# Patient Record
Sex: MALE | Race: WHITE | Employment: FULL TIME | URBAN - METROPOLITAN AREA
[De-identification: names, ages, dates, MRNs, and addresses within clinical notes are randomized per-mention and may not be internally consistent; named-entity substitution may affect disease eponyms.]

---

## 2017-08-23 ENCOUNTER — OFFICE VISIT (OUTPATIENT)
Dept: FAMILY MEDICINE | Facility: CLINIC | Age: 42
End: 2017-08-23
Payer: COMMERCIAL

## 2017-08-23 VITALS
HEART RATE: 82 BPM | HEIGHT: 71 IN | TEMPERATURE: 99.6 F | OXYGEN SATURATION: 97 % | SYSTOLIC BLOOD PRESSURE: 108 MMHG | RESPIRATION RATE: 18 BRPM | DIASTOLIC BLOOD PRESSURE: 70 MMHG | BODY MASS INDEX: 35.84 KG/M2 | WEIGHT: 256 LBS

## 2017-08-23 DIAGNOSIS — M62.830 SPASM OF THORACIC BACK MUSCLE: Primary | ICD-10-CM

## 2017-08-23 PROCEDURE — 99214 OFFICE O/P EST MOD 30 MIN: CPT | Performed by: FAMILY MEDICINE

## 2017-08-23 RX ORDER — TRAMADOL HYDROCHLORIDE 50 MG/1
50-100 TABLET ORAL EVERY 6 HOURS PRN
Qty: 20 TABLET | Refills: 0 | Status: SHIPPED | OUTPATIENT
Start: 2017-08-23

## 2017-08-23 RX ORDER — CYCLOBENZAPRINE HCL 10 MG
5-10 TABLET ORAL 3 TIMES DAILY PRN
Qty: 30 TABLET | Refills: 1 | Status: SHIPPED | OUTPATIENT
Start: 2017-08-23

## 2017-08-23 ASSESSMENT — ENCOUNTER SYMPTOMS
NECK PAIN: 1
FOCAL WEAKNESS: 0
EYES NEGATIVE: 1
SENSORY CHANGE: 0
CONSTITUTIONAL NEGATIVE: 1
TINGLING: 0
HEADACHES: 0

## 2017-08-23 NOTE — PROGRESS NOTES
"HPI    SUBJECTIVE:   Casey Johnston is a 41 year old male who presents to clinic today for the following health issues:      Back Pain       Duration: 3 days        Specific cause: none    Description:   Location of pain: upper back left and shoulders left  Character of pain: sharp, dull ache  Pain radiation:none  New numbness or weakness in legs, not attributed to pain:  no     Intensity: Currently 5/10    History:   Pain interferes with job: YES, missed 3 days of work  History of back problems: recurrent self limited episodes of low back pain in the past  Any previous MRI or X-rays: None  Sees a specialist for back pain:  No  Therapies tried without relief: none    Alleviating factors:   Improved by: acetaminophen (Tylenol) and NSAIDs, takes the edge off      Precipitating factors:  Worsened by: reaching out or above head    Functional and Psychosocial Screen (Yunier STarT Back):      Not performed today          Accompanying Signs & Symptoms:  Risk of Fracture:  None  Risk of Cauda Equina:  None  Risk of Infection:  None  Risk of Cancer:  None  Risk of Ankylosing Spondylitis:  Onset at age <35, male, AND morning back stiffness. no     Woke three days ago with a bit of back pain and a \"knot\" in his back, worsened quickly over the morning.  Stayed home from work yesterday and today.  Better today.  Pain seems to arise in L neck, radiates to shoulder.  NO numbness/weakness into arm or hand, no HA.  Back-aid, advil, took the edge off, didn't really go away.  Topical helps.    No daily meds, no tobacco curerntly    Review of Systems   Constitutional: Negative.    Eyes: Negative.    Musculoskeletal: Positive for neck pain.   Neurological: Negative for tingling, sensory change, focal weakness and headaches.         Physical Exam   Constitutional: He is oriented to person, place, and time and well-developed, well-nourished, and in no distress.   Musculoskeletal:        Cervical back: He exhibits decreased range of " motion, pain and spasm. He exhibits no tenderness and no bony tenderness.   Neurological: He is alert and oriented to person, place, and time. He has normal strength.   Reflex Scores:       Tricep reflexes are 2+ on the left side.       Bicep reflexes are 2+ on the left side.       Brachioradialis reflexes are 2+ on the left side.  Skin: Skin is warm and dry.   Vitals reviewed.    (M62.830) Spasm of thoracic back muscle  (primary encounter diagnosis)  Comment: pain ladder discussed, can modify work restrictions if needed  Plan: cyclobenzaprine (FLEXERIL) 10 MG tablet,         traMADol (ULTRAM) 50 MG tablet              RTC in 2w    Casey Madden MD

## 2017-08-23 NOTE — NURSING NOTE
"Chief Complaint   Patient presents with     Back Pain     Letter for School/Work     work       Initial /70 (BP Location: Right arm, Patient Position: Chair, Cuff Size: Adult Large)  Pulse 82  Temp 99.6  F (37.6  C) (Oral)  Resp 18  Ht 5' 11\" (1.803 m)  Wt 256 lb (116.1 kg)  SpO2 97%  BMI 35.7 kg/m2 Estimated body mass index is 35.7 kg/(m^2) as calculated from the following:    Height as of this encounter: 5' 11\" (1.803 m).    Weight as of this encounter: 256 lb (116.1 kg).  Medication Reconciliation: complete.Sherrie ESCALERA MA      "

## 2017-08-23 NOTE — MR AVS SNAPSHOT
"              After Visit Summary   8/23/2017    Casey Johnston    MRN: [id number]           Patient Information     Date Of Birth          1975        Visit Information        Provider Department      8/23/2017 2:20 PM Casey Madden MD [facility]        Today's Diagnoses     Spasm of thoracic back muscle    -  1      Care Instructions    Pairing acetaminophen (Tylenol) with ibuprofen (Advil) has been shown to be as effective as morphine for controlling pain.    600-800 mg ibuprofen (3-4 tabs) with breakfast, lunch and dinner  1000 mg acetaminophen (2 extra strength tabs) at mid-morning, mid-afternoon and evening.    Do not take more than 3000mg of acetaminophen (6 extra strength tabs) in 24 hours.            Follow-ups after your visit        Who to contact     If you have questions or need follow up information about today's clinic visit or your schedule please contact [facility] directly at [phone].  Normal or non-critical lab and imaging results will be communicated to you by [employer], letter or phone within 4 business days after the clinic has received the results. If you do not hear from us within 7 days, please contact the clinic through [employer] or phone. If you have a critical or abnormal lab result, we will notify you by phone as soon as possible.  Submit refill requests through [employer] or call your pharmacy and they will forward the refill request to us. Please allow 3 business days for your refill to be completed.          Additional Information About Your Visit        [employer]First[employer] Information     [employer] lets you send messages to your doctor, view your test results, renew your prescriptions, schedule appointments and more. To sign up, go to [email or web address] . Click on \"Log in\" on the left side of the screen, which will take you to the Welcome page. Then click on \"Sign up Now\" on the right side of the page.     You will be asked to enter the access " "code listed below, as well as some personal information. Please follow the directions to create your username and password.     Your access code is: [email or web address]  Expires: 2017  2:54 PM     Your access code will  in 90 days. If you need help or a new code, please call your [facility] or [phone].        Care EveryWhere ID     This is your Care EveryWhere ID. This could be used by other organizations to access your [place] medical records  [id number]        Your Vitals Were     Pulse Temperature Respirations Height Pulse Oximetry BMI (Body Mass Index)    82 99.6  F (37.6  C) (Oral) 18 5' 11\" (1.803 m) 97% 35.7 kg/m2       Blood Pressure from Last 3 Encounters:   17 108/70   10/30/13 110/78   13 118/80    Weight from Last 3 Encounters:   17 256 lb (116.1 kg)   10/30/13 245 lb (111.1 kg)   13 249 lb (112.9 kg)              Today, you had the following     No orders found for display         Today's Medication Changes          These changes are accurate as of: 17  2:54 PM.  If you have any questions, ask your nurse or doctor.               Start taking these medicines.        Dose/Directions    cyclobenzaprine 10 MG tablet   Commonly known as:  FLEXERIL   Used for:  Spasm of thoracic back muscle   Started by:  Casey Madden MD        Dose:  5-10 mg   Take 0.5-1 tablets (5-10 mg) by mouth 3 times daily as needed for muscle spasms   Quantity:  30 tablet   Refills:  1       traMADol 50 MG tablet   Commonly known as:  ULTRAM   Used for:  Spasm of thoracic back muscle   Started by:  Casey Madden MD        Dose:   mg   Take 1-2 tablets ( mg) by mouth every 6 hours as needed for pain   Quantity:  20 tablet   Refills:  0            Where to get your medicines      These medications were sent to [facility]/pharmacy #[id number] - [place], [place] - [address] [facility] [address]     Phone:  [phone]     cyclobenzaprine 10 MG " tablet         Some of these will need a paper prescription and others can be bought over the counter.  Ask your nurse if you have questions.     Bring a paper prescription for each of these medications     traMADol 50 MG tablet                Primary Care Provider Office Phone # Fax #    Casey Madden [phone] [id number]  [facility] [address]        Equal Access to Services     RICH ROCHA : Hadii aad ku hadasho Soomaali, waaxda luqadaha, qaybta kaalmada adeegyada, waxay idiin hayaan adeeg kharash la'adriánn ah. So [facility] [phone].    ATENCIÓN: Si habla español, tiene a redmond disposición servicios gratuitos de asistencia lingüística. Jessica al [phone].    We comply with applicable federal civil rights laws and [place] laws. We do not discriminate on the basis of race, color, national origin, age, disability sex, sexual orientation or gender identity.            Thank you!     Thank you for choosing [facility]  for your care. Our goal is always to provide you with excellent care. Hearing back from our patients is one way we can continue to improve our services. Please take a few minutes to complete the written survey that you may receive in the mail after your visit with us. Thank you!             Your Updated Medication List - Protect others around you: Learn how to safely use, store and throw away your medicines at www.disposemymeds.org.          This list is accurate as of: 8/23/17  2:54 PM.  Always use your most recent med list.                   Brand Name Dispense Instructions for use Diagnosis    cyclobenzaprine 10 MG tablet    FLEXERIL    30 tablet    Take 0.5-1 tablets (5-10 mg) by mouth 3 times daily as needed for muscle spasms    Spasm of thoracic back muscle       traMADol 50 MG tablet    ULTRAM    20 tablet    Take 1-2 tablets ( mg) by mouth every 6 hours as needed for pain    Spasm of thoracic back muscle

## 2017-08-23 NOTE — LETTER
[address], [address]  [facility] [address]  Phone: [phone]  Fax: [phone]    August 23, 2017        Casey Johnston  [address] [facility] [address]          To whom it may concern:    RE: Casey Johnston    Patient was seen and treated today at our clinic and missed work 8/22/17 and 8/23/17.  Patient may return to work 8/24/17 with the following:  No working or lifting restrictions    Please contact me for questions or concerns.      Sincerely,        Casey Madden MD

## 2019-03-11 ENCOUNTER — HOSPITAL ENCOUNTER (EMERGENCY)
Facility: CLINIC | Age: 44
Discharge: HOME | End: 2019-03-11
Attending: EMERGENCY MEDICINE | Admitting: EMERGENCY MEDICINE
Payer: COMMERCIAL

## 2019-03-11 ENCOUNTER — APPOINTMENT (OUTPATIENT)
Dept: CT IMAGING | Facility: CLINIC | Age: 44
End: 2019-03-11
Attending: EMERGENCY MEDICINE
Payer: COMMERCIAL

## 2019-03-11 VITALS
TEMPERATURE: 98.1 F | SYSTOLIC BLOOD PRESSURE: 128 MMHG | DIASTOLIC BLOOD PRESSURE: 81 MMHG | HEART RATE: 76 BPM | OXYGEN SATURATION: 95 % | RESPIRATION RATE: 18 BRPM

## 2019-03-11 DIAGNOSIS — R31.29 MICROSCOPIC HEMATURIA: ICD-10-CM

## 2019-03-11 DIAGNOSIS — R10.84 ABDOMINAL PAIN, GENERALIZED: ICD-10-CM

## 2019-03-11 LAB
ALBUMIN SERPL-MCNC: 3.9 G/DL (ref 3.4–5)
ALBUMIN UR-MCNC: NEGATIVE MG/DL
ALP SERPL-CCNC: 116 U/L (ref 40–150)
ALT SERPL W P-5'-P-CCNC: 41 U/L (ref 0–70)
ANION GAP SERPL CALCULATED.3IONS-SCNC: 5 MMOL/L (ref 3–14)
APPEARANCE UR: CLEAR
AST SERPL W P-5'-P-CCNC: 27 U/L (ref 0–45)
BASOPHILS # BLD AUTO: 0.1 10E9/L (ref 0–0.2)
BASOPHILS NFR BLD AUTO: 1.5 %
BILIRUB SERPL-MCNC: 0.3 MG/DL (ref 0.2–1.3)
BILIRUB UR QL STRIP: NEGATIVE
BUN SERPL-MCNC: 8 MG/DL (ref 7–30)
CALCIUM SERPL-MCNC: 8.7 MG/DL (ref 8.5–10.1)
CHLORIDE SERPL-SCNC: 103 MMOL/L (ref 94–109)
CO2 SERPL-SCNC: 30 MMOL/L (ref 20–32)
COLOR UR AUTO: YELLOW
CREAT SERPL-MCNC: 1.09 MG/DL (ref 0.66–1.25)
DIFFERENTIAL METHOD BLD: NORMAL
EOSINOPHIL # BLD AUTO: 0.2 10E9/L (ref 0–0.7)
EOSINOPHIL NFR BLD AUTO: 3.1 %
ERYTHROCYTE [DISTWIDTH] IN BLOOD BY AUTOMATED COUNT: 12.8 % (ref 10–15)
GFR SERPL CREATININE-BSD FRML MDRD: 82 ML/MIN/{1.73_M2}
GLUCOSE SERPL-MCNC: 102 MG/DL (ref 70–99)
GLUCOSE UR STRIP-MCNC: NEGATIVE MG/DL
HCT VFR BLD AUTO: 46.5 % (ref 40–53)
HGB BLD-MCNC: 15.5 G/DL (ref 13.3–17.7)
HGB UR QL STRIP: ABNORMAL
IMM GRANULOCYTES # BLD: 0 10E9/L (ref 0–0.4)
IMM GRANULOCYTES NFR BLD: 0.3 %
KETONES UR STRIP-MCNC: NEGATIVE MG/DL
LEUKOCYTE ESTERASE UR QL STRIP: NEGATIVE
LIPASE SERPL-CCNC: 119 U/L (ref 73–393)
LYMPHOCYTES # BLD AUTO: 2.2 10E9/L (ref 0.8–5.3)
LYMPHOCYTES NFR BLD AUTO: 30.9 %
MCH RBC QN AUTO: 29.3 PG (ref 26.5–33)
MCHC RBC AUTO-ENTMCNC: 33.3 G/DL (ref 31.5–36.5)
MCV RBC AUTO: 88 FL (ref 78–100)
MONOCYTES # BLD AUTO: 0.8 10E9/L (ref 0–1.3)
MONOCYTES NFR BLD AUTO: 11.3 %
MUCOUS THREADS #/AREA URNS LPF: PRESENT /LPF
NEUTROPHILS # BLD AUTO: 3.8 10E9/L (ref 1.6–8.3)
NEUTROPHILS NFR BLD AUTO: 52.9 %
NITRATE UR QL: NEGATIVE
NRBC # BLD AUTO: 0 10*3/UL
NRBC BLD AUTO-RTO: 0 /100
PH UR STRIP: 6 PH (ref 5–7)
PLATELET # BLD AUTO: 279 10E9/L (ref 150–450)
POTASSIUM SERPL-SCNC: 3.7 MMOL/L (ref 3.4–5.3)
PROT SERPL-MCNC: 8.5 G/DL (ref 6.8–8.8)
RBC # BLD AUTO: 5.29 10E12/L (ref 4.4–5.9)
RBC #/AREA URNS AUTO: 18 /HPF (ref 0–2)
SODIUM SERPL-SCNC: 138 MMOL/L (ref 133–144)
SOURCE: ABNORMAL
SP GR UR STRIP: 1.02 (ref 1–1.03)
SQUAMOUS #/AREA URNS AUTO: <1 /HPF (ref 0–1)
UROBILINOGEN UR STRIP-MCNC: 0 MG/DL (ref 0–2)
WBC # BLD AUTO: 7.2 10E9/L (ref 4–11)
WBC #/AREA URNS AUTO: 2 /HPF (ref 0–5)

## 2019-03-11 PROCEDURE — 85025 COMPLETE CBC W/AUTO DIFF WBC: CPT | Performed by: EMERGENCY MEDICINE

## 2019-03-11 PROCEDURE — 83690 ASSAY OF LIPASE: CPT | Performed by: EMERGENCY MEDICINE

## 2019-03-11 PROCEDURE — 74177 CT ABD & PELVIS W/CONTRAST: CPT

## 2019-03-11 PROCEDURE — 99285 EMERGENCY DEPT VISIT HI MDM: CPT | Mod: 25

## 2019-03-11 PROCEDURE — 25000128 H RX IP 250 OP 636: Performed by: EMERGENCY MEDICINE

## 2019-03-11 PROCEDURE — 81001 URINALYSIS AUTO W/SCOPE: CPT | Performed by: EMERGENCY MEDICINE

## 2019-03-11 PROCEDURE — 80053 COMPREHEN METABOLIC PANEL: CPT | Performed by: EMERGENCY MEDICINE

## 2019-03-11 RX ORDER — IOPAMIDOL 755 MG/ML
500 INJECTION, SOLUTION INTRAVASCULAR ONCE
Status: COMPLETED | OUTPATIENT
Start: 2019-03-11 | End: 2019-03-11

## 2019-03-11 RX ADMIN — IOPAMIDOL 100 ML: 755 INJECTION, SOLUTION INTRAVENOUS at 20:20

## 2019-03-11 RX ADMIN — SODIUM CHLORIDE 65 ML: 9 INJECTION, SOLUTION INTRAVENOUS at 20:20

## 2019-03-11 NOTE — ED AVS SNAPSHOT
[facility] Emergency Department  [address]  [facility] [address]  Phone:  [phone]  Fax:  [phone]                                    Casey Johnston   MRN: [id number]    Department:  [facility] Emergency Department   Date of Visit:  3/11/2019           After Visit Summary Signature Page    I have received my discharge instructions, and my questions have been answered. I have discussed any challenges I see with this plan with the nurse or doctor.    ..........................................................................................................................................  Patient/Patient Representative Signature      ..........................................................................................................................................  Patient Representative Print Name and Relationship to Patient    ..................................................               ................................................  Date                                   Time    ..........................................................................................................................................  Reviewed by Signature/Title    ...................................................              ..............................................  Date                                               Time          22EPIC Rev 08/18

## 2019-03-11 NOTE — LETTER
March 11, 2019      To Whom It May Concern:      Casey Johnston was seen in our Emergency Department today, 03/11/19.  I expect his condition to improve over the next 2 days.  He may return to work/school when improved.    Sincerely,        KEE ANTONIO RN

## 2019-03-12 NOTE — DISCHARGE INSTRUCTIONS
Return to the ED if you are unable to tolerate fluids, intractable nausea or vomiting, severe abdominal pain, fevers >101 or other acute changes.  Please follow up with your PCP in 2-3 days.      Have your PCP repeat urine sample     Discharge Instructions  Abdominal Pain    Abdominal pain (belly pain) can be caused by many things. Your evaluation today does not show the exact cause for your pain. Your provider today has decided that it is unlikely your pain is due to a life threatening problem, or a problem requiring surgery or hospital admission. Sometimes those problems cannot be found right away, so it is very important that you follow up as directed.  Sometimes only the changes which occur over time allow the cause of your pain to be found.    Generally, every Emergency Department visit should have a follow-up clinic visit with either a primary or a specialty clinic/provider. Please follow-up as instructed by your emergency provider today. With abdominal pain, we often recommend very close follow-up, such as the following day.    ADULTS:  Return to the Emergency Department right away if:    You get an oral temperature above 102oF or as directed by your provider.  You have blood in your stools. This may be bright red or appear as black, tarry stools.    You keep vomiting (throwing up) or cannot drink liquids.  You see blood when you vomit.   You cannot have a bowel movement or you cannot pass gas.  Your stomach gets bloated or bigger.  Your skin or the whites of your eyes look yellow.  You faint.  You have bloody, frequent or painful urination (peeing).  You have new symptoms or anything that worries you.    CHILDREN:  Return to the Emergency Department right away if your child has any of the above-listed symptoms or the following:    Pushes your hand away or screams/cries when his/her belly is touched.  You notice your child is very fussy or weak.  Your child is very tired and is too tired to eat or  drink.  Your child is dehydrated.  Signs of dehydration can be:  Significant change in the amount of wet diapers/urine.  Your infant or child starts to have dry mouth and lips, or no saliva (spit) or tears.    PREGNANT WOMEN:  Return to the Emergency Department right away if you have any of the above-listed symptoms or the following:    You have bleeding, leaking fluid or passing tissue from the vagina.  You have worse pain or cramping, or pain in your shoulder or back.  You have vomiting that will not stop.  You have a temperature of 100oF or more.  Your baby is not moving as much as usual.  You faint.  You get a bad headache with or without eye problems and abdominal pain.  You have a seizure.  You have unusual discharge from your vagina and abdominal pain.    Abdominal pain is pretty common during pregnancy.  Your pain may or may not be related to your pregnancy. You should follow-up closely with your OB provider so they can evaluate you and your baby.  Until you follow-up with your regular provider, do the following:     Avoid sex and do not put anything in your vagina.  Drink clear fluids.  Only take medications approved by your provider.    MORE INFORMATION:    Appendicitis:  A possible cause of abdominal pain in any person who still has their appendix is acute appendicitis. Appendicitis is often hard to diagnose.  Testing does not always rule out early appendicitis or other causes of abdominal pain. Close follow-up with your provider and re-evaluations may be needed to figure out the reason for your abdominal pain.    Follow-up:  It is very important that you make an appointment with your clinic and go to the appointment.  If you do not follow-up with your primary provider, it may result in missing an important development which could result in permanent injury or disability and/or lasting pain.  If there is any problem keeping your appointment, call your provider or return to the Emergency  "Department.    Medications:  Take your medications as directed by your provider today.  Before using over-the-counter medications, ask your provider and make sure to take the medications as directed.  If you have any questions about medications, ask your provider.    Diet:  Resume your normal diet as much as possible, but do not eat fried, fatty or spicy foods while you have pain.  Do not drink alcohol or have caffeine.  Do not smoke tobacco.    Probiotics: If you have been given an antibiotic, you may want to also take a probiotic pill or eat yogurt with live cultures. Probiotics have \"good bacteria\" to help your intestines stay healthy. Studies have shown that probiotics help prevent diarrhea (loose stools) and other intestine problems (including C. diff infection) when you take antibiotics. You can buy these without a prescription in the pharmacy section of the store.     If you were given a prescription for medicine here today, be sure to read all of the information (including the package insert) that comes with your prescription.  This will include important information about the medicine, its side effects, and any warnings that you need to know about.  The pharmacist who fills the prescription can provide more information and answer questions you may have about the medicine.  If you have questions or concerns that the pharmacist cannot address, please call or return to the Emergency Department.       Remember that you can always come back to the Emergency Department if you are not able to see your regular provider in the amount of time listed above, if you get any new symptoms, or if there is anything that worries you.    "

## 2019-03-12 NOTE — ED PROVIDER NOTES
History     Chief Complaint:  Constipation and Abdominal Pain     The history is provided by the patient.      Casey Johnston is a 43 year old male who presents to the emergency department today for evaluation of constipation and abdominal pain. Review of records indicates patient complaining of left lower quadrant and suprapubic abdominal pain since 3/1 with subsequent x-ray indicating severe constipation. He notes minimal  success with enemas and suppositories at home. He asserts he had a very small bowel movement this morning despite an abnormal level of bearing down. He states his abdominal pain is currently improved compared to last week as he experienced severe fever, chill, and nausea last week. He states previous innocuous smells caused him severe nausea. Additionally, he acknowledges increased urinary frequency at night that is abnormal. He denies any emesis, black and bloody stools hematuria, dysuria, rectal pain, testicular pain, chest pain as well as alcohol, tobacco, and street drug use. He denies any other concerns.  No testicular pain.  No rectal pain or pressure.  No bloody stools.      Allergies:  No Known Drug Allergies    Medications:    Ultram    Past Medical History:    Encephalitis    Past Surgical History:    Appendectomy  Abdominal hernia repair  Femur fracture repair    Family History:    Father: CAD  Maternal grandmother: colorectal cancer  Maternal grandfather: hypertension  Paternal grandmother: emphysema  Paternal grandfather: hypertension    Social History:  Smoking Status: Former Smoker  Smokeless Tobacco: Never Used  Alcohol Use: Negative  Marital Status: Single      Review of Systems   All other systems reviewed and are negative.      Physical Exam     Patient Vitals for the past 24 hrs:   BP Temp Temp src Pulse Heart Rate Resp SpO2   03/11/19 1707 (!) 126/103 98.1  F (36.7  C) Oral 76 76 18 95 %     Physical Exam  General: Resting on the bed.  Head: No obvious trauma to  head.  Ears, Nose, Throat:  External ears normal.  Nose normal.  No pharyngeal erythema, swelling or exudate.  Midline uvula.    Eyes:  Conjunctivae clear.  Pupils are equal, round, and reactive.   Neck: Normal range of motion.  Neck supple.   CV: Regular rate and rhythm.  No murmurs.      Respiratory: Effort normal and breath sounds normal.  No wheezing or crackles.   Gastrointestinal: Soft.  No distension. There is mild lower abdominal tenderness.  There is no rigidity, no rebound and no guarding.   Musculoskeletal: Non tender non edematous calves  Neuro: Alert. Moving all extremities appropriately.  Normal speech.    Skin: Skin is warm and dry.  No rash noted.     Emergency Department Course     Imaging:  Radiology findings were communicated with the patient who voiced understanding of the findings.    CT Abdomen Pelvis w Contrast  No acute abnormality demonstrated in the abdomen or  pelvis.    TACO GARCIA MD  Reading per radiology    Laboratory:  Laboratory findings were communicated with the patient who voiced understanding of the findings.    UA: blood small (A), RBC 18 (H), mucous present (A) o/w WNL  CBC: WBC 7.2, HGB 15.5,   CMP: glucose 102 (H) o/w WNL (Creatinine 1.09)  Lipase: 119    Emergency Department Course:    1858 Nursing notes and vitals reviewed.     I performed an exam of the patient as documented above.      IV was inserted and blood was drawn for laboratory testing, results above.     The patient provided a urine sample here in the emergency department. This was sent for laboratory testing, findings above.     The patient was sent for a CT while in the emergency department, results above.       Patient rechecked and updated.      Patient rechecked and updated. I personally reviewed the imaging and laboratory results with the patient and answered all related questions prior to discharge.    Impression & Plan      Medical Decision Makin-year-old male  presents with low abdominal tenderness.  Vital signs are unremarkable.  Broad differential was pursued including but not limited to perforation, diverticulitis, obstruction, colitis, electrolyte metabolic or renal dysfunction, nephrolithiasis, UTI, constipation, acute surgical process, etc.  CBC shows no leukocytosis or anemia.  BMP shows no acute electrolyte, metabolic or renal dysfunction.  LFTs and lipase are reassuring not concerning for obstructive pathology, hepatitis, pancreatitis.  Urinalysis shows microscopic hematuria.  Unclear clinical significance.  Patient has no pain or flank discomfort.  CT shows no suggestion of nephrolithiasis.  Advised that he follow-up with his primary doctor regarding this.  No suggestion of infectious process.  CT scan shows no evidence of acute surgical process.  No suggestion of diverticulitis, colitis, obstruction, etc.  No significant amount of noted stool either.  Patient's exam is relatively benign.  No indication of acute surgical process.  No indication for emergent admission at this point.  Advised that he continue supportive care as for possible constipation until he is having normal bowel movements.  Advised close follow-up with his primary doctor and repeat urinalysis for his microscopic hematuria.  Advise close return precautions if anything changes.  He voiced understanding the plan.  Patient was discharged in stable improved condition.    Diagnosis:    ICD-10-CM   1. Abdominal pain, generalized R10.84   2. Microscopic hematuria R31.29     Disposition:   The patient is discharged to home.     Scribe Disclosure:  FRANCOIS, Edward Aaron, am serving as a scribe at 7:14 PM on 3/11/2019 to document services personally performed by Jaja Ignacio MD based on my observations and the provider's statements to me.    [facility] EMERGENCY DEPARTMENT       Jaja Ignacio MD  03/12/19 9010

## 2019-11-03 ENCOUNTER — HEALTH MAINTENANCE LETTER (OUTPATIENT)
Age: 44
End: 2019-11-03

## 2020-11-16 ENCOUNTER — HEALTH MAINTENANCE LETTER (OUTPATIENT)
Age: 45
End: 2020-11-16

## 2021-09-18 ENCOUNTER — HEALTH MAINTENANCE LETTER (OUTPATIENT)
Age: 46
End: 2021-09-18

## 2022-01-08 ENCOUNTER — HEALTH MAINTENANCE LETTER (OUTPATIENT)
Age: 47
End: 2022-01-08

## 2022-11-20 ENCOUNTER — HEALTH MAINTENANCE LETTER (OUTPATIENT)
Age: 47
End: 2022-11-20

## 2023-04-15 ENCOUNTER — HEALTH MAINTENANCE LETTER (OUTPATIENT)
Age: 48
End: 2023-04-15